# Patient Record
Sex: MALE | Race: WHITE | NOT HISPANIC OR LATINO
[De-identification: names, ages, dates, MRNs, and addresses within clinical notes are randomized per-mention and may not be internally consistent; named-entity substitution may affect disease eponyms.]

---

## 2022-12-28 PROBLEM — Z00.00 ENCOUNTER FOR PREVENTIVE HEALTH EXAMINATION: Status: ACTIVE | Noted: 2022-12-28

## 2023-01-04 ENCOUNTER — NON-APPOINTMENT (OUTPATIENT)
Age: 28
End: 2023-01-04

## 2023-01-04 ENCOUNTER — APPOINTMENT (OUTPATIENT)
Dept: SURGERY | Facility: CLINIC | Age: 28
End: 2023-01-04
Payer: COMMERCIAL

## 2023-01-04 VITALS
RESPIRATION RATE: 18 BRPM | HEART RATE: 87 BPM | HEIGHT: 67 IN | SYSTOLIC BLOOD PRESSURE: 130 MMHG | OXYGEN SATURATION: 97 % | WEIGHT: 237 LBS | DIASTOLIC BLOOD PRESSURE: 75 MMHG | BODY MASS INDEX: 37.2 KG/M2

## 2023-01-04 DIAGNOSIS — Z77.22 CONTACT WITH AND (SUSPECTED) EXPOSURE TO ENVIRONMENTAL TOBACCO SMOKE (ACUTE) (CHRONIC): ICD-10-CM

## 2023-01-04 DIAGNOSIS — Z83.3 FAMILY HISTORY OF DIABETES MELLITUS: ICD-10-CM

## 2023-01-04 DIAGNOSIS — Z83.438 FAMILY HISTORY OF OTHER DISORDER OF LIPOPROTEIN METABOLISM AND OTHER LIPIDEMIA: ICD-10-CM

## 2023-01-04 DIAGNOSIS — Z78.9 OTHER SPECIFIED HEALTH STATUS: ICD-10-CM

## 2023-01-04 DIAGNOSIS — Z82.49 FAMILY HISTORY OF ISCHEMIC HEART DISEASE AND OTHER DISEASES OF THE CIRCULATORY SYSTEM: ICD-10-CM

## 2023-01-04 DIAGNOSIS — Z82.3 FAMILY HISTORY OF STROKE: ICD-10-CM

## 2023-01-04 DIAGNOSIS — Z72.89 OTHER PROBLEMS RELATED TO LIFESTYLE: ICD-10-CM

## 2023-01-04 PROCEDURE — 99203 OFFICE O/P NEW LOW 30 MIN: CPT

## 2023-01-04 NOTE — PLAN
[FreeTextEntry1] : This is a 27-year-old healthy male with a lump on his left mid back that has had for several years.  He felt it happened when he pulled a muscle once.  He had an outpatient CAT scan by his primary care physician which shows a 7 cm lipomatous lesion that is subcutaneous in lesion and not invading adjacent structures.\par \par Review of systems: General-denies fatigue.  Cardiac- denies chest pain.  Respiratory- denies shortness of breath.  GI-denies nausea or vomiting.  -denies dysuria.  Musculoskeletal-denies back pain.  Psychiatric-denies hearing voices.\par \par Limited exam: On his left upper back at the area corresponding to CT there is a obvious soft tissue mass that is palpable.  It is approximately 7 cm as described.  It does not appear to be submuscular and it does appear to be subcutaneous as described on CAT scan.  There are no overlying skin changes.\par \par Plan: Patient has a large subcu cutaneous soft tissue mass on his left mid back.  We will plan for excision in the operating room.  This can likely be done in lithotomy but that position under sedation.  The patient or stands at he will have a large scar.  That is at risk of infection.  That he will likely need a drain and likely to form a seroma.  Risk of bleeding, numbness, scar, and recurrence have been explained to the patient in detail and he is agreeable to the procedure.  In addition there are the usual medical risk associated with anesthesia including but not limited to cardiac, neurologic, pulmonary, and vascular complications including death.  Patient understands these risks and is agreeable to surgery.  We will give Ancef prophylaxis.  The patient does not require any preoperative optimization as he is young and healthy.

## 2023-01-11 ENCOUNTER — TRANSCRIPTION ENCOUNTER (OUTPATIENT)
Age: 28
End: 2023-01-11

## 2023-02-03 ENCOUNTER — RESULT REVIEW (OUTPATIENT)
Age: 28
End: 2023-02-03

## 2023-02-05 ENCOUNTER — RESULT REVIEW (OUTPATIENT)
Age: 28
End: 2023-02-05

## 2023-02-06 ENCOUNTER — APPOINTMENT (OUTPATIENT)
Dept: SURGERY | Facility: HOSPITAL | Age: 28
End: 2023-02-06

## 2023-02-06 ENCOUNTER — TRANSCRIPTION ENCOUNTER (OUTPATIENT)
Age: 28
End: 2023-02-06

## 2023-02-27 ENCOUNTER — APPOINTMENT (OUTPATIENT)
Dept: SURGERY | Facility: CLINIC | Age: 28
End: 2023-02-27
Payer: COMMERCIAL

## 2023-02-27 VITALS
HEART RATE: 88 BPM | DIASTOLIC BLOOD PRESSURE: 57 MMHG | OXYGEN SATURATION: 97 % | SYSTOLIC BLOOD PRESSURE: 133 MMHG | TEMPERATURE: 98.5 F

## 2023-02-27 DIAGNOSIS — D17.1 BENIGN LIPOMATOUS NEOPLASM OF SKIN AND SUBCUTANEOUS TISSUE OF TRUNK: ICD-10-CM

## 2023-02-27 PROCEDURE — 99024 POSTOP FOLLOW-UP VISIT: CPT

## 2023-02-27 NOTE — PLAN
[FreeTextEntry1] : Patient is 3-week status post excision of a soft tissue mass from his back.  He has no complaints.  Pathology confirms lipoma.  On exam his incision is well-healed.  Did not of the absorbable suture is cut at this time.  There is no infection.\par \par Plan: The patient will follow me on as-needed basis.  He has no restrictions.

## 2023-05-10 ENCOUNTER — APPOINTMENT (OUTPATIENT)
Dept: SURGERY | Facility: CLINIC | Age: 28
End: 2023-05-10
Payer: COMMERCIAL

## 2023-05-10 VITALS
HEART RATE: 78 BPM | DIASTOLIC BLOOD PRESSURE: 56 MMHG | SYSTOLIC BLOOD PRESSURE: 113 MMHG | OXYGEN SATURATION: 97 % | TEMPERATURE: 98.5 F

## 2023-05-10 DIAGNOSIS — M54.9 DORSALGIA, UNSPECIFIED: ICD-10-CM

## 2023-05-10 PROCEDURE — 99213 OFFICE O/P EST LOW 20 MIN: CPT

## 2023-05-10 NOTE — PLAN
[FreeTextEntry1] : Patient is 3 months status post excision of a soft tissue mass from his left upper back.  Pathology showed that it was benign lipoma.  He states that recently it began to hurt in that area and it gets worse when he finishes walking his dog.  He feels a lump in that area and is concerned that the lipoma is growing back.\par \par On exam his incision is well-healed.  I can feel the cavity where the lipoma used to be.  At the lower portion of the wound there is a small 2 cm area of fullness which corresponds to the area that he believes the lipoma is growing back yet.\par \par Plan: I do not believe the lipoma is growing back.  I suspect that he has some muscle pull as this was an intramuscular lipoma or perhaps he had a hematoma and this is organizing blood clot.  I have recommended that we wait at least 1 year before considering reexploration as the postoperative changes could complicate the surgical anatomy and is possible that his symptoms could completely resolved as well as this lump.  He is currently agreeable to that plan.  If he finds that this discomfort is affecting his quality of life that he will return in August and we will possibly explore this at that time.  If he can wait that I would prefer to push this off till January/February 2024 which would likely make the surgery more successful.  It is unlikely that a lipoma would start to grow back and be palpable and only 3 months time.  It is also unusual that he had no pain and no lump and now suddenly there is 1.  This is more consistent with a bleed or muscle pull than recurrence of lipoma.